# Patient Record
Sex: MALE | Race: WHITE | NOT HISPANIC OR LATINO | URBAN - METROPOLITAN AREA
[De-identification: names, ages, dates, MRNs, and addresses within clinical notes are randomized per-mention and may not be internally consistent; named-entity substitution may affect disease eponyms.]

---

## 2017-01-17 ENCOUNTER — COMMUNICATION - HEALTHEAST (OUTPATIENT)
Dept: FAMILY MEDICINE | Facility: CLINIC | Age: 52
End: 2017-01-17

## 2017-07-05 ENCOUNTER — COMMUNICATION - HEALTHEAST (OUTPATIENT)
Dept: FAMILY MEDICINE | Facility: CLINIC | Age: 52
End: 2017-07-05

## 2017-07-05 DIAGNOSIS — I10 ESSENTIAL HYPERTENSION: ICD-10-CM

## 2017-09-24 ENCOUNTER — COMMUNICATION - HEALTHEAST (OUTPATIENT)
Dept: FAMILY MEDICINE | Facility: CLINIC | Age: 52
End: 2017-09-24

## 2017-09-24 DIAGNOSIS — I10 ESSENTIAL HYPERTENSION: ICD-10-CM

## 2017-09-27 ENCOUNTER — COMMUNICATION - HEALTHEAST (OUTPATIENT)
Dept: FAMILY MEDICINE | Facility: CLINIC | Age: 52
End: 2017-09-27

## 2017-09-27 ENCOUNTER — OFFICE VISIT - HEALTHEAST (OUTPATIENT)
Dept: FAMILY MEDICINE | Facility: CLINIC | Age: 52
End: 2017-09-27

## 2017-09-27 DIAGNOSIS — Z12.5 PROSTATE CANCER SCREENING: ICD-10-CM

## 2017-09-27 DIAGNOSIS — I10 ESSENTIAL HYPERTENSION: ICD-10-CM

## 2017-09-27 DIAGNOSIS — Z00.00 ROUTINE GENERAL MEDICAL EXAMINATION AT A HEALTH CARE FACILITY: ICD-10-CM

## 2017-09-27 DIAGNOSIS — E78.00 HIGH CHOLESTEROL: ICD-10-CM

## 2017-09-27 DIAGNOSIS — Z12.11 SCREEN FOR COLON CANCER: ICD-10-CM

## 2017-09-27 LAB
CHOLEST SERPL-MCNC: 216 MG/DL
FASTING STATUS PATIENT QL REPORTED: YES
HDLC SERPL-MCNC: 44 MG/DL
LDLC SERPL CALC-MCNC: 141 MG/DL
PSA SERPL-MCNC: 0.5 NG/ML (ref 0–3.5)
TRIGL SERPL-MCNC: 155 MG/DL

## 2017-09-27 ASSESSMENT — MIFFLIN-ST. JEOR: SCORE: 1698.08

## 2017-11-29 ENCOUNTER — COMMUNICATION - HEALTHEAST (OUTPATIENT)
Dept: FAMILY MEDICINE | Facility: CLINIC | Age: 52
End: 2017-11-29

## 2017-11-29 DIAGNOSIS — I10 ESSENTIAL HYPERTENSION: ICD-10-CM

## 2018-01-12 ENCOUNTER — COMMUNICATION - HEALTHEAST (OUTPATIENT)
Dept: FAMILY MEDICINE | Facility: CLINIC | Age: 53
End: 2018-01-12

## 2018-01-12 DIAGNOSIS — I10 ESSENTIAL HYPERTENSION: ICD-10-CM

## 2018-01-13 RX ORDER — ATENOLOL 100 MG/1
TABLET ORAL
Qty: 90 TABLET | Refills: 2 | Status: SHIPPED | OUTPATIENT
Start: 2018-01-13

## 2018-09-20 ENCOUNTER — COMMUNICATION - HEALTHEAST (OUTPATIENT)
Dept: FAMILY MEDICINE | Facility: CLINIC | Age: 53
End: 2018-09-20

## 2018-09-20 DIAGNOSIS — I10 ESSENTIAL HYPERTENSION: ICD-10-CM

## 2018-10-08 ENCOUNTER — COMMUNICATION - HEALTHEAST (OUTPATIENT)
Dept: FAMILY MEDICINE | Facility: CLINIC | Age: 53
End: 2018-10-08

## 2018-10-08 DIAGNOSIS — I10 ESSENTIAL HYPERTENSION: ICD-10-CM

## 2021-05-31 VITALS — HEIGHT: 70 IN | WEIGHT: 190 LBS | BODY MASS INDEX: 27.2 KG/M2

## 2021-06-02 ENCOUNTER — RECORDS - HEALTHEAST (OUTPATIENT)
Dept: ADMINISTRATIVE | Facility: CLINIC | Age: 56
End: 2021-06-02

## 2021-06-13 NOTE — PROGRESS NOTES
SUBJECTIVE:  Washington Gu is a 52 y.o. male who presents for a physical exam.  Has started exercising recently.  On atenolol for BP.  No side effects.  Treated for at least 10 years.  Denies chest pain, shortness of breath, has stable lymphedema of left calf.           Healthy Habits:   Regular Exercise: Yes  Sunscreen Use: no  Healthy Diet: Yes  Dental Visits Regularly: Yes  Seat Belt: Yes  Hemoccults OR Colonoscopy: No  Lipid Profile: Yes--2015  Glucose Screen: Yes 2015  Domestic violence: No    IMMUNIZATIONS:  Immunization History   Administered Date(s) Administered     DT (pediatric) 01/19/2001     Influenza, seasonal,quad inj 6-35 mos 01/16/2009     Td, historic 01/19/2001     Tdap 07/14/2015       MEDICATIONS:  Current Outpatient Prescriptions on File Prior to Visit   Medication Sig Dispense Refill     atenolol (TENORMIN) 100 MG tablet TAKE ONE-HALF TABLET TWICE A DAY. Overdue for Clinic Visit. Please Schedule. 90 tablet 0     No current facility-administered medications on file prior to visit.         ALLERGIES:  Review of patient's allergies indicates no known allergies.     PAST MEDICAL HISTORY:  Patient Active Problem List   Diagnosis     Nephrolithiasis     Hyperlipidemia     Hypertension     Shortness Of Breath     Lymphedema         PAST SURGICAL HISTORY:  No past surgical history on file.    FAMILY HISTORY  Family History   Problem Relation Age of Onset     Alcohol abuse Father        SOCIAL HISTORY  Social History     Social History     Marital status:      Spouse name: N/A     Number of children: N/A     Years of education: N/A     Occupational History     Not on file.     Social History Main Topics     Smoking status: Former Smoker     Smokeless tobacco: Not on file     Alcohol use 0.0 - 2.4 oz/week     0 - 4 Shots of liquor per week     Drug use: Not on file     Sexual activity: Not on file     Other Topics Concern     Not on file     Social History Narrative         REVIEW OF  "SYSTEMS  General: no weight changes, fatigue  HEENT:  no HA,  no vision changes, URI sx  Respiratory:  no cough, dyspnea  Cardiovascular: no chest pain, palpitations  Gastrointestinal: no nausea/vomiting, diarrhea/constipation, melena  : no dysuria  Neurologic: no seizures, focal weakness, tremors  Endocrine: no cold intolerance  Heme: no unusual bleeding or bruising  Skin: no rashes        OBJECTIVE:  Vitals:    09/27/17 0831   BP: 132/82   Patient Site: Right Arm   Patient Position: Sitting   Cuff Size: Adult Regular   Pulse: 80   Resp: 16   Temp: 98.1  F (36.7  C)   TempSrc: Oral   Weight: 190 lb (86.2 kg)   Height: 5' 10\" (1.778 m)       Body mass index is 27.26 kg/(m^2).    GEN:  NAD, cooperative  MS:  A&O, affect normal, speech normal  HEENT:  Conjunctiva clear.  Sclera anicteric.  Nares clear.  Oropharynx clear without erythema or exudate.  TMs and EACs normal.  NECK:  supple, no lymphadenopathy or thyromegaly  CV:  S1S2 RRR, no M/R/G.  No carotid bruis  RESP:  CTA bilaterally.   ABD: +BS, soft, nontender, nondistended, No organomegaly   EXT:  Warm and dry, swelling left lower leg to knee  NEUROLOGIC:  PERRLA.  A & O x 3.  No tremor, no focal findings.  Normal gait.    SKIN:  No rashes or lesions.    : Normal penis.  Testes without masses or tenderness.  No inguinal hernia.    Rectal exam:  Prostate mildly enlarged; no masses palpated.  Normal sphincter tone                ASSESSMENT/PLAN:  1. Routine general medical examination at a health care facility     2. Screen for colon cancer  Ambulatory referral for Colonoscopy   3. High cholesterol  Lipid Cascade   4. Hypertension  Basic Metabolic Panel    Lipid Cascade   5. Prostate cancer screening  PSA (Prostatic-Specific Antigen), Annual Screen      HTN well controlled.  Refilled meds for one year.  Last cholesterol 206, will recheck today.  AHA risk screening indicates 10 year risk of heart disease/stroke of 7.5% and recommends moderate to high intensity " statin.  He would like to see numbers today. Continue with exercise, weight loss and diet.  Would plan recheck of lipids in 3-6 months before initiating statins.  AHA risk screening indicates no aspirin with risk of less than 10%.  Given number to schedule colonoscopy  Last PSA two years ago--low.  Discussed risks and benefits.  He would like to have it checked again.  Former smoker with 20 pack-year history--doesn't meet criteria for lung cancer screening.        The following high BMI interventions were performed this visit: encouragement to exercise, weight monitoring and dietary management education, guidance, and counseling     Follow up in 1 year or before for problems     Gurmeet Putnam MD  9/27/2017

## 2021-08-22 ENCOUNTER — HEALTH MAINTENANCE LETTER (OUTPATIENT)
Age: 56
End: 2021-08-22

## 2021-10-17 ENCOUNTER — HEALTH MAINTENANCE LETTER (OUTPATIENT)
Age: 56
End: 2021-10-17

## 2022-10-02 ENCOUNTER — HEALTH MAINTENANCE LETTER (OUTPATIENT)
Age: 57
End: 2022-10-02

## 2023-12-30 ENCOUNTER — HEALTH MAINTENANCE LETTER (OUTPATIENT)
Age: 58
End: 2023-12-30